# Patient Record
Sex: MALE | Race: OTHER | NOT HISPANIC OR LATINO | URBAN - METROPOLITAN AREA
[De-identification: names, ages, dates, MRNs, and addresses within clinical notes are randomized per-mention and may not be internally consistent; named-entity substitution may affect disease eponyms.]

---

## 2020-07-04 ENCOUNTER — EMERGENCY (EMERGENCY)
Facility: HOSPITAL | Age: 70
LOS: 0 days | Discharge: HOME | End: 2020-07-04
Attending: EMERGENCY MEDICINE | Admitting: EMERGENCY MEDICINE
Payer: SUBSIDIZED

## 2020-07-04 VITALS — DIASTOLIC BLOOD PRESSURE: 63 MMHG | TEMPERATURE: 96 F | SYSTOLIC BLOOD PRESSURE: 106 MMHG | HEART RATE: 98 BPM

## 2020-07-04 VITALS
HEART RATE: 86 BPM | SYSTOLIC BLOOD PRESSURE: 123 MMHG | TEMPERATURE: 98 F | RESPIRATION RATE: 18 BRPM | OXYGEN SATURATION: 98 % | DIASTOLIC BLOOD PRESSURE: 76 MMHG

## 2020-07-04 DIAGNOSIS — Z87.891 PERSONAL HISTORY OF NICOTINE DEPENDENCE: ICD-10-CM

## 2020-07-04 DIAGNOSIS — R73.09 OTHER ABNORMAL GLUCOSE: ICD-10-CM

## 2020-07-04 DIAGNOSIS — R07.9 CHEST PAIN, UNSPECIFIED: ICD-10-CM

## 2020-07-04 DIAGNOSIS — R35.0 FREQUENCY OF MICTURITION: ICD-10-CM

## 2020-07-04 DIAGNOSIS — R06.02 SHORTNESS OF BREATH: ICD-10-CM

## 2020-07-04 DIAGNOSIS — I48.91 UNSPECIFIED ATRIAL FIBRILLATION: ICD-10-CM

## 2020-07-04 LAB
ANION GAP SERPL CALC-SCNC: 11 MMOL/L — SIGNIFICANT CHANGE UP (ref 7–14)
APPEARANCE UR: CLEAR — SIGNIFICANT CHANGE UP
BASOPHILS # BLD AUTO: 0.04 K/UL — SIGNIFICANT CHANGE UP (ref 0–0.2)
BASOPHILS NFR BLD AUTO: 0.5 % — SIGNIFICANT CHANGE UP (ref 0–1)
BILIRUB UR-MCNC: NEGATIVE — SIGNIFICANT CHANGE UP
BUN SERPL-MCNC: 16 MG/DL — SIGNIFICANT CHANGE UP (ref 10–20)
CALCIUM SERPL-MCNC: 9.2 MG/DL — SIGNIFICANT CHANGE UP (ref 8.5–10.1)
CHLORIDE SERPL-SCNC: 102 MMOL/L — SIGNIFICANT CHANGE UP (ref 98–110)
CO2 SERPL-SCNC: 24 MMOL/L — SIGNIFICANT CHANGE UP (ref 17–32)
COLOR SPEC: SIGNIFICANT CHANGE UP
CREAT SERPL-MCNC: 0.7 MG/DL — SIGNIFICANT CHANGE UP (ref 0.7–1.5)
DIFF PNL FLD: SIGNIFICANT CHANGE UP
EOSINOPHIL # BLD AUTO: 0.14 K/UL — SIGNIFICANT CHANGE UP (ref 0–0.7)
EOSINOPHIL NFR BLD AUTO: 1.8 % — SIGNIFICANT CHANGE UP (ref 0–8)
GLUCOSE SERPL-MCNC: 130 MG/DL — HIGH (ref 70–99)
GLUCOSE UR QL: NEGATIVE — SIGNIFICANT CHANGE UP
HCT VFR BLD CALC: 44.7 % — SIGNIFICANT CHANGE UP (ref 42–52)
HGB BLD-MCNC: 14.7 G/DL — SIGNIFICANT CHANGE UP (ref 14–18)
IMM GRANULOCYTES NFR BLD AUTO: 0.3 % — SIGNIFICANT CHANGE UP (ref 0.1–0.3)
KETONES UR-MCNC: NEGATIVE — SIGNIFICANT CHANGE UP
LEUKOCYTE ESTERASE UR-ACNC: NEGATIVE — SIGNIFICANT CHANGE UP
LYMPHOCYTES # BLD AUTO: 1.37 K/UL — SIGNIFICANT CHANGE UP (ref 1.2–3.4)
LYMPHOCYTES # BLD AUTO: 17.8 % — LOW (ref 20.5–51.1)
MCHC RBC-ENTMCNC: 30.2 PG — SIGNIFICANT CHANGE UP (ref 27–31)
MCHC RBC-ENTMCNC: 32.9 G/DL — SIGNIFICANT CHANGE UP (ref 32–37)
MCV RBC AUTO: 92 FL — SIGNIFICANT CHANGE UP (ref 80–94)
MONOCYTES # BLD AUTO: 0.56 K/UL — SIGNIFICANT CHANGE UP (ref 0.1–0.6)
MONOCYTES NFR BLD AUTO: 7.3 % — SIGNIFICANT CHANGE UP (ref 1.7–9.3)
NEUTROPHILS # BLD AUTO: 5.56 K/UL — SIGNIFICANT CHANGE UP (ref 1.4–6.5)
NEUTROPHILS NFR BLD AUTO: 72.3 % — SIGNIFICANT CHANGE UP (ref 42.2–75.2)
NITRITE UR-MCNC: NEGATIVE — SIGNIFICANT CHANGE UP
NRBC # BLD: 0 /100 WBCS — SIGNIFICANT CHANGE UP (ref 0–0)
NT-PROBNP SERPL-SCNC: 1167 PG/ML — HIGH (ref 0–300)
PH UR: 6.5 — SIGNIFICANT CHANGE UP (ref 5–8)
PLATELET # BLD AUTO: 163 K/UL — SIGNIFICANT CHANGE UP (ref 130–400)
POTASSIUM SERPL-MCNC: 4.5 MMOL/L — SIGNIFICANT CHANGE UP (ref 3.5–5)
POTASSIUM SERPL-SCNC: 4.5 MMOL/L — SIGNIFICANT CHANGE UP (ref 3.5–5)
PROT UR-MCNC: NEGATIVE — SIGNIFICANT CHANGE UP
RBC # BLD: 4.86 M/UL — SIGNIFICANT CHANGE UP (ref 4.7–6.1)
RBC # FLD: 12.9 % — SIGNIFICANT CHANGE UP (ref 11.5–14.5)
SODIUM SERPL-SCNC: 137 MMOL/L — SIGNIFICANT CHANGE UP (ref 135–146)
SP GR SPEC: 1.01 — SIGNIFICANT CHANGE UP (ref 1.01–1.02)
TROPONIN T SERPL-MCNC: <0.01 NG/ML — SIGNIFICANT CHANGE UP
UROBILINOGEN FLD QL: SIGNIFICANT CHANGE UP
WBC # BLD: 7.69 K/UL — SIGNIFICANT CHANGE UP (ref 4.8–10.8)
WBC # FLD AUTO: 7.69 K/UL — SIGNIFICANT CHANGE UP (ref 4.8–10.8)

## 2020-07-04 PROCEDURE — 71045 X-RAY EXAM CHEST 1 VIEW: CPT | Mod: 26

## 2020-07-04 PROCEDURE — 99285 EMERGENCY DEPT VISIT HI MDM: CPT

## 2020-07-04 PROCEDURE — 93010 ELECTROCARDIOGRAM REPORT: CPT

## 2020-07-04 NOTE — ED PROVIDER NOTE - CLINICAL SUMMARY MEDICAL DECISION MAKING FREE TEXT BOX
69 yo male looking for health insurance so he can start seeing a doctor in the [place].  Arrived from another from [place] a few months ago, no physician her, but brought medications from [place] and is complaints with them.  No acute complaints, screening labs done, CXR WNL, will follow up in medicine clinic.

## 2020-07-04 NOTE — ED PROVIDER NOTE - CARE PROVIDER_API CALL
Eliceo Barillas  CARDIAC ELECTROPHYSIOLOGY  [address] [place] [id number]  Phone: [phone]  Fax: [phone]  Follow Up Time: 4-6 Days

## 2020-07-04 NOTE — ED PROVIDER NOTE - PHYSICAL EXAMINATION
Vital Signs: I have reviewed the initial vital signs.  Constitutional: well-nourished, appears stated age, no acute distress  HEENT: PERRL, pink conjunctivae  Cardiovascular: irregular rate and rhythm, well-perfused extremities, distal pulses intact  Respiratory: unlabored respiratory effort, speaking full sentences, equal air entry bilaterally, fine inspiratory  crackles at the rt base  Gastrointestinal: soft, obese, non-tender abdomen, no pulsatile mass, no CVA ttp  Musculoskeletal: supple neck, b/l non-pitting lower extremity edema , FROM at all joints  Integumentary: warm, dry, no rash, chronic venous stasis changes b/l lower legs  Neurologic: awake, alert, cranial nerves II-XII grossly intact, extremities’ motor and sensory functions grossly intact  Psychiatric: appropriate mood, appropriate affect

## 2020-07-04 NOTE — ED PROVIDER NOTE - NS ED ROS FT
Constitutional: (-) fever, (-) chills, (+) weight loss due to change in diet, (-) excessive fatigue  Eyes/ENT: (-) blurry vision, (-) epistaxis, (-) sore throat or earache  Cardiovascular: (-) chest pain, (-) syncope, (+) palpitations  Respiratory: (-) cough, (+)occasional shortness of breath  Gastrointestinal: (-) nausea or  vomiting, (-) diarrhea, (-) abdominal pain  :(-) dysuria or hematuria, (+) urinary frequency  Musculoskeletal: (-) neck pain, (-) back pain, (-)new joint pain  Integumentary: (-) rash, (-) edema  Neurological: (-) headache, (-) altered mental status, (-) focal weakness or paresthesias  Psychiatric: (-) hallucinations  Allergic/Immunologic: (-) pruritus

## 2020-07-04 NOTE — ED PROVIDER NOTE - OBJECTIVE STATEMENT
71 yo male PMH paroxysmal a.fib treated with digoxin and ASA, ( in the past had chemical cardioversion, there were plans in [place] to perform ablation, but it was never done), chronic leg edema for decades for which he takes Lasix 20 mg occasionally, congenital cardiomegaly ( according to his son who is a physician many family members  have this condition) came here for medical evaluation.  [place] came to the [place] March 2nd, has not been able to get insurance due to COVID situation in the country, c/o occasional SOB and according to his billher with whom I spoke on the phone his glucose has been high for the past month, he also endorsed urinary frequency. 69 yo male PMH paroxysmal a.fib treated with digoxin and ASA, ( in the past had chemical cardioversion, there were plans in [place] to perform ablation, but it was never done), chronic leg edema for decades for which he takes Lasix 20 mg occasionally, congenital cardiomegaly ( according to his son who is a physician many family members  have this condition) came here stating " I need insurance ".Patient came to the [place] March 2nd, has not been able to get insurance due to COVID situation in the country, c/o occasional SOB and according to his jose with whom I spoke on the phone his glucose has been high for the past month, he also endorsed urinary frequency.  She modified his diet, resticted junk food and sweets.

## 2020-07-04 NOTE — ED PROVIDER NOTE - NSFOLLOWUPINSTRUCTIONS_ED_ALL_ED_FT
PLEASE FOLLOW UP IN MEDICINE CLINIC AND WITH A CARDIOLOGIST, CALL ON MONDAY MORNING TO SCHEDULE AN APPOINTMENT.  RETURN TO ER IF ANY CONCERNING OR WORSENING/ACUTE PROBLEMS.

## 2020-07-04 NOTE — ED PROVIDER NOTE - NSFOLLOWUPCLINICS_GEN_ALL_ED_FT
[facility]  Medicine  [address], [place]   Phone: [phone]  Fax:   Follow Up Time: 4-6 Days

## 2020-07-04 NOTE — ED PROVIDER NOTE - CARE PROVIDERS DIRECT ADDRESSES
[email or web address]

## 2020-07-04 NOTE — ED PROVIDER NOTE - PATIENT PORTAL LINK FT
You can access the FollowMyHealth Patient Portal offered by [facility] by registering at the following website: [email or web address]. By joining [facility]’s FollowMyHealth portal, you will also be able to view your health information using other applications (apps) compatible with our system.

## 2020-07-04 NOTE — ED PROVIDER NOTE - PROGRESS NOTE DETAILS
Patient refused , he wanted his daughter and son to translate over the phone for him.  His son is a physician in [place]. Blood glucose is 130, patient appears well, eager to go home.  Will give contact info for cardiology and medical cardiology.  As per my conversation with his son , patient has a fib, no blood thinners other than ASA, his SOB is baseline and leg swelling unchanged for decades..  I updated the family on results of all tests, daughter will pick him up. Patient to be discharged from ED. Any available test results were discussed with patient and family. Verbal instructions given, including instructions to return to ED immediately for any new, worsening, or concerning symptoms. Patient endorsed understanding. Written discharge instructions additionally given, including follow-up plan.  Patient was given opportunity to ask questions.

## 2020-10-10 ENCOUNTER — INPATIENT (INPATIENT)
Facility: HOSPITAL | Age: 70
LOS: 2 days | Discharge: HOME | End: 2020-10-13
Attending: INTERNAL MEDICINE | Admitting: INTERNAL MEDICINE
Payer: MEDICAID

## 2020-10-10 VITALS
HEART RATE: 128 BPM | SYSTOLIC BLOOD PRESSURE: 123 MMHG | TEMPERATURE: 98 F | WEIGHT: 242.51 LBS | DIASTOLIC BLOOD PRESSURE: 77 MMHG | RESPIRATION RATE: 18 BRPM | OXYGEN SATURATION: 97 %

## 2020-10-10 LAB
ALBUMIN SERPL ELPH-MCNC: 4.2 G/DL — SIGNIFICANT CHANGE UP (ref 3.5–5.2)
ALP SERPL-CCNC: 61 U/L — SIGNIFICANT CHANGE UP (ref 30–115)
ALT FLD-CCNC: 28 U/L — SIGNIFICANT CHANGE UP (ref 0–41)
ANION GAP SERPL CALC-SCNC: 14 MMOL/L — SIGNIFICANT CHANGE UP (ref 7–14)
AST SERPL-CCNC: 42 U/L — HIGH (ref 0–41)
BASOPHILS # BLD AUTO: 0.04 K/UL — SIGNIFICANT CHANGE UP (ref 0–0.2)
BASOPHILS NFR BLD AUTO: 0.5 % — SIGNIFICANT CHANGE UP (ref 0–1)
BILIRUB SERPL-MCNC: 0.4 MG/DL — SIGNIFICANT CHANGE UP (ref 0.2–1.2)
BUN SERPL-MCNC: 20 MG/DL — SIGNIFICANT CHANGE UP (ref 10–20)
CALCIUM SERPL-MCNC: 8.9 MG/DL — SIGNIFICANT CHANGE UP (ref 8.5–10.1)
CHLORIDE SERPL-SCNC: 101 MMOL/L — SIGNIFICANT CHANGE UP (ref 98–110)
CO2 SERPL-SCNC: 22 MMOL/L — SIGNIFICANT CHANGE UP (ref 17–32)
CREAT SERPL-MCNC: 0.8 MG/DL — SIGNIFICANT CHANGE UP (ref 0.7–1.5)
DIGOXIN SERPL-MCNC: 0.5 NG/ML — LOW (ref 0.8–2)
EOSINOPHIL # BLD AUTO: 0.18 K/UL — SIGNIFICANT CHANGE UP (ref 0–0.7)
EOSINOPHIL NFR BLD AUTO: 2.1 % — SIGNIFICANT CHANGE UP (ref 0–8)
GLUCOSE SERPL-MCNC: 117 MG/DL — HIGH (ref 70–99)
HCT VFR BLD CALC: 45.8 % — SIGNIFICANT CHANGE UP (ref 42–52)
HGB BLD-MCNC: 14.7 G/DL — SIGNIFICANT CHANGE UP (ref 14–18)
IMM GRANULOCYTES NFR BLD AUTO: 0.2 % — SIGNIFICANT CHANGE UP (ref 0.1–0.3)
LYMPHOCYTES # BLD AUTO: 1.99 K/UL — SIGNIFICANT CHANGE UP (ref 1.2–3.4)
LYMPHOCYTES # BLD AUTO: 23.1 % — SIGNIFICANT CHANGE UP (ref 20.5–51.1)
MCHC RBC-ENTMCNC: 29.1 PG — SIGNIFICANT CHANGE UP (ref 27–31)
MCHC RBC-ENTMCNC: 32.1 G/DL — SIGNIFICANT CHANGE UP (ref 32–37)
MCV RBC AUTO: 90.7 FL — SIGNIFICANT CHANGE UP (ref 80–94)
MONOCYTES # BLD AUTO: 0.61 K/UL — HIGH (ref 0.1–0.6)
MONOCYTES NFR BLD AUTO: 7.1 % — SIGNIFICANT CHANGE UP (ref 1.7–9.3)
NEUTROPHILS # BLD AUTO: 5.79 K/UL — SIGNIFICANT CHANGE UP (ref 1.4–6.5)
NEUTROPHILS NFR BLD AUTO: 67 % — SIGNIFICANT CHANGE UP (ref 42.2–75.2)
NRBC # BLD: 0 /100 WBCS — SIGNIFICANT CHANGE UP (ref 0–0)
NT-PROBNP SERPL-SCNC: 1038 PG/ML — HIGH (ref 0–300)
PLATELET # BLD AUTO: 161 K/UL — SIGNIFICANT CHANGE UP (ref 130–400)
POTASSIUM SERPL-MCNC: 4.8 MMOL/L — SIGNIFICANT CHANGE UP (ref 3.5–5)
POTASSIUM SERPL-SCNC: 4.8 MMOL/L — SIGNIFICANT CHANGE UP (ref 3.5–5)
PROT SERPL-MCNC: 7.1 G/DL — SIGNIFICANT CHANGE UP (ref 6–8)
RBC # BLD: 5.05 M/UL — SIGNIFICANT CHANGE UP (ref 4.7–6.1)
RBC # FLD: 13.3 % — SIGNIFICANT CHANGE UP (ref 11.5–14.5)
SODIUM SERPL-SCNC: 137 MMOL/L — SIGNIFICANT CHANGE UP (ref 135–146)
TROPONIN T SERPL-MCNC: <0.01 NG/ML — SIGNIFICANT CHANGE UP
WBC # BLD: 8.63 K/UL — SIGNIFICANT CHANGE UP (ref 4.8–10.8)
WBC # FLD AUTO: 8.63 K/UL — SIGNIFICANT CHANGE UP (ref 4.8–10.8)

## 2020-10-10 PROCEDURE — 93010 ELECTROCARDIOGRAM REPORT: CPT

## 2020-10-10 PROCEDURE — 99285 EMERGENCY DEPT VISIT HI MDM: CPT

## 2020-10-10 PROCEDURE — 71046 X-RAY EXAM CHEST 2 VIEWS: CPT | Mod: 26

## 2020-10-10 RX ORDER — ENOXAPARIN SODIUM 100 MG/ML
110 INJECTION SUBCUTANEOUS ONCE
Refills: 0 | Status: COMPLETED | OUTPATIENT
Start: 2020-10-10 | End: 2020-10-10

## 2020-10-10 RX ORDER — CIPROFLOXACIN AND DEXAMETHASONE 3; 1 MG/ML; MG/ML
1 SUSPENSION/ DROPS AURICULAR (OTIC) ONCE
Refills: 0 | Status: DISCONTINUED | OUTPATIENT
Start: 2020-10-10 | End: 2020-10-12

## 2020-10-10 RX ORDER — FUROSEMIDE 40 MG
20 TABLET ORAL ONCE
Refills: 0 | Status: COMPLETED | OUTPATIENT
Start: 2020-10-10 | End: 2020-10-10

## 2020-10-10 RX ADMIN — ENOXAPARIN SODIUM 110 MILLIGRAM(S): 100 INJECTION SUBCUTANEOUS at 21:25

## 2020-10-10 RX ADMIN — Medication 20 MILLIGRAM(S): at 21:25

## 2020-10-10 RX ADMIN — Medication 1 TABLET(S): at 21:25

## 2020-10-10 NOTE — ED PROVIDER NOTE - ATTENDING CONTRIBUTION TO CARE
Patient states that for the last few days has been having throat pain with fullness/pain in rt ear and today noted bleeding from Rt ear, so had come to ED for evaluation. Patient denies HA/dizziness/trauma. Patient states that he has no PMD and had not seen anyone [place], states tried to make the appointments and could not get due to COVID-19 Pandemic and also could apply for his insurance due to COVID-19 pandemic closures and related delays. Patient has been taking medications that he got from country [place], states his son is doctor in [place] and he prescribed the medications for him. Patient with lower ext swelling and MAI, denies sob at rest. Denies cp/abd pain, denies f/c/abd pain. Denies ear trauma.   Vitals noted  Patient is awake, alert, appears comfortable and not in distress.   Rt ear exam: External auditory canal has dried blood, appears was bleeding excoriated areas, patient denies any trauma, unable to visualize TM due to blood in the ear canal and narrow ear canal.   Throat: +pharyngeal erythema, no exudates  supple neck, no lymphadenopathy.   lungs: CTA  abd: +BS, NT, ND, soft  Ext: +pitting edema, no calf tenderness  CNS: awake, alert, o x 3, no focal neurologic deficits  A/P: A. fib with intermittent RVR  URI  Rt ear canal excoriated areas with bleeding-- needs ENT follow up to further evaluate those excoriated areas, will treat with abx prophylactically.   EKG, labs, CXR  reevaluation

## 2020-10-10 NOTE — ED PROVIDER NOTE - CARE PLAN
Principal Discharge DX:	Atrial fibrillation  Secondary Diagnosis:	Pharyngitis   Principal Discharge DX:	Atrial fibrillation  Secondary Diagnosis:	Pharyngitis  Secondary Diagnosis:	Other congestive heart failure

## 2020-10-10 NOTE — ED PROVIDER NOTE - PROGRESS NOTE DETAILS
Axbx given for pharyngitis, pt found to be in afib, will start anticoagulation and admit endorsed to mar

## 2020-10-10 NOTE — ED ADULT TRIAGE NOTE - CHIEF COMPLAINT QUOTE
Pt complaining of sore throat, and bleeding from right ear. On triage pt's heartrate was bouncing all over the place. EKG ordered and preformed.

## 2020-10-10 NOTE — ED PROVIDER NOTE - OBJECTIVE STATEMENT
paroxysmal a.fib treated with digoxin and ASA, ( in the past had chemical cardioversion) chronic leg edema- Lasix 20 mg occasionally, congenital cardiomegaly ( according to his son who is a physician many family members  have this condition) paroxysmal a.fib treated with digoxin and ASA, ( in the past had chemical cardioversion) chronic leg edema- Lasix 20 mg occasionally, presents with sore throat and right ear pain x few days. also admits to mild SOB over last few days. No fever, chills, cough, chest pain, palpitations, abd pain, or nausea, vomiting, diarrhea. pt does not have a pmd, and is non compliant.

## 2020-10-10 NOTE — ED PROVIDER NOTE - NS ED ROS FT
Review of Systems:  	•	CONSTITUTIONAL - no fever, no diaphoresis, no chills  	•	SKIN - no rash  	•	HEMATOLOGIC - no bleeding, no bruising  	•	EYES - no eye pain, no blurry vision  	•	ENT - no change in hearing, + sore throat, + ear pain, no tinnitus  	•	RESPIRATORY - no shortness of breath, no cough  	•	CARDIAC - no chest pain, no palpitations  	•	GI - no abd pain, no nausea, no vomiting, no diarrhea, no constipation  	•	GENITO-URINARY - no discharge, no dysuria; no hematuria, no increased urinary frequency  	•	MUSCULOSKELETAL - no joint paint, no swelling, no redness  	•	NEUROLOGIC - no weakness, no headache, no paresthesias, no LOC  	•	PSYCH - no anxiety, non suicidal, non homicidal, no hallucination, no depression

## 2020-10-10 NOTE — ED PROVIDER NOTE - PHYSICAL EXAMINATION
CONST: Well appearing in NAD  EYES: PERRL, EOMI, Sclera and conjunctiva clear.   ENT: No nasal discharge. TM's clear B/L without drainage. Oropharynx erythematous, No abscess or swelling. Uvula midline. No temporal artery or mastoid tenderness.  NECK: Non-tender, no meningeal signs. normal ROM. supple   CARD: Normal S1 S2; irregular rhythm   RESP: Equal BS B/L, No wheezes, rhonchi or rales. No distress  GI: Soft, non-tender, non-distended. no cva tenderness. normal BS  MS: Normal ROM in all extremities. No midline spinal tenderness. pulses 2 +. pitting edema bilaterally (chronic as per pt)  SKIN: Warm, dry, no acute rashes. Good turgor  NEURO: A&Ox3, No focal deficits. Strength 5/5 with no sensory deficits. Steady gait.

## 2020-10-10 NOTE — ED PROVIDER NOTE - CLINICAL SUMMARY MEDICAL DECISION MAKING FREE TEXT BOX
patient remained hemodynamically stable, results of the labs, imaging findings reviewed and discussed with patient, discussed with admitting physician and MAR, patient is admitted to Medicine for further evaluation and care.

## 2020-10-11 LAB
ALBUMIN SERPL ELPH-MCNC: 4.3 G/DL — SIGNIFICANT CHANGE UP (ref 3.5–5.2)
ALP SERPL-CCNC: 58 U/L — SIGNIFICANT CHANGE UP (ref 30–115)
ALT FLD-CCNC: 26 U/L — SIGNIFICANT CHANGE UP (ref 0–41)
ANION GAP SERPL CALC-SCNC: 12 MMOL/L — SIGNIFICANT CHANGE UP (ref 7–14)
AST SERPL-CCNC: 33 U/L — SIGNIFICANT CHANGE UP (ref 0–41)
BASOPHILS # BLD AUTO: 0.05 K/UL — SIGNIFICANT CHANGE UP (ref 0–0.2)
BASOPHILS NFR BLD AUTO: 0.7 % — SIGNIFICANT CHANGE UP (ref 0–1)
BILIRUB SERPL-MCNC: 0.5 MG/DL — SIGNIFICANT CHANGE UP (ref 0.2–1.2)
BUN SERPL-MCNC: 18 MG/DL — SIGNIFICANT CHANGE UP (ref 10–20)
CALCIUM SERPL-MCNC: 9.3 MG/DL — SIGNIFICANT CHANGE UP (ref 8.5–10.1)
CHLORIDE SERPL-SCNC: 98 MMOL/L — SIGNIFICANT CHANGE UP (ref 98–110)
CO2 SERPL-SCNC: 27 MMOL/L — SIGNIFICANT CHANGE UP (ref 17–32)
CREAT SERPL-MCNC: 0.9 MG/DL — SIGNIFICANT CHANGE UP (ref 0.7–1.5)
D DIMER BLD IA.RAPID-MCNC: 152 NG/ML DDU — SIGNIFICANT CHANGE UP (ref 0–230)
EOSINOPHIL # BLD AUTO: 0.16 K/UL — SIGNIFICANT CHANGE UP (ref 0–0.7)
EOSINOPHIL NFR BLD AUTO: 2.3 % — SIGNIFICANT CHANGE UP (ref 0–8)
GLUCOSE SERPL-MCNC: 114 MG/DL — HIGH (ref 70–99)
HCT VFR BLD CALC: 43.5 % — SIGNIFICANT CHANGE UP (ref 42–52)
HGB BLD-MCNC: 14.1 G/DL — SIGNIFICANT CHANGE UP (ref 14–18)
IMM GRANULOCYTES NFR BLD AUTO: 0.4 % — HIGH (ref 0.1–0.3)
LYMPHOCYTES # BLD AUTO: 1.63 K/UL — SIGNIFICANT CHANGE UP (ref 1.2–3.4)
LYMPHOCYTES # BLD AUTO: 22.9 % — SIGNIFICANT CHANGE UP (ref 20.5–51.1)
MAGNESIUM SERPL-MCNC: 2.2 MG/DL — SIGNIFICANT CHANGE UP (ref 1.8–2.4)
MCHC RBC-ENTMCNC: 29 PG — SIGNIFICANT CHANGE UP (ref 27–31)
MCHC RBC-ENTMCNC: 32.4 G/DL — SIGNIFICANT CHANGE UP (ref 32–37)
MCV RBC AUTO: 89.3 FL — SIGNIFICANT CHANGE UP (ref 80–94)
MONOCYTES # BLD AUTO: 0.55 K/UL — SIGNIFICANT CHANGE UP (ref 0.1–0.6)
MONOCYTES NFR BLD AUTO: 7.7 % — SIGNIFICANT CHANGE UP (ref 1.7–9.3)
NEUTROPHILS # BLD AUTO: 4.69 K/UL — SIGNIFICANT CHANGE UP (ref 1.4–6.5)
NEUTROPHILS NFR BLD AUTO: 66 % — SIGNIFICANT CHANGE UP (ref 42.2–75.2)
NRBC # BLD: 0 /100 WBCS — SIGNIFICANT CHANGE UP (ref 0–0)
PHOSPHATE SERPL-MCNC: 3.3 MG/DL — SIGNIFICANT CHANGE UP (ref 2.1–4.9)
PLATELET # BLD AUTO: 167 K/UL — SIGNIFICANT CHANGE UP (ref 130–400)
POTASSIUM SERPL-MCNC: 4.4 MMOL/L — SIGNIFICANT CHANGE UP (ref 3.5–5)
POTASSIUM SERPL-SCNC: 4.4 MMOL/L — SIGNIFICANT CHANGE UP (ref 3.5–5)
PROT SERPL-MCNC: 7.2 G/DL — SIGNIFICANT CHANGE UP (ref 6–8)
RBC # BLD: 4.87 M/UL — SIGNIFICANT CHANGE UP (ref 4.7–6.1)
RBC # FLD: 13.2 % — SIGNIFICANT CHANGE UP (ref 11.5–14.5)
SARS-COV-2 RNA SPEC QL NAA+PROBE: SIGNIFICANT CHANGE UP
SODIUM SERPL-SCNC: 137 MMOL/L — SIGNIFICANT CHANGE UP (ref 135–146)
TROPONIN T SERPL-MCNC: <0.01 NG/ML — SIGNIFICANT CHANGE UP
TROPONIN T SERPL-MCNC: <0.01 NG/ML — SIGNIFICANT CHANGE UP
WBC # BLD: 7.11 K/UL — SIGNIFICANT CHANGE UP (ref 4.8–10.8)
WBC # FLD AUTO: 7.11 K/UL — SIGNIFICANT CHANGE UP (ref 4.8–10.8)

## 2020-10-11 PROCEDURE — 93306 TTE W/DOPPLER COMPLETE: CPT | Mod: 26

## 2020-10-11 PROCEDURE — 93010 ELECTROCARDIOGRAM REPORT: CPT

## 2020-10-11 PROCEDURE — 99223 1ST HOSP IP/OBS HIGH 75: CPT | Mod: AI

## 2020-10-11 RX ORDER — DIGOXIN 250 MCG
0.25 TABLET ORAL ONCE
Refills: 0 | Status: COMPLETED | OUTPATIENT
Start: 2020-10-11 | End: 2020-10-11

## 2020-10-11 RX ORDER — HYDROCHLOROTHIAZIDE 25 MG
25 TABLET ORAL DAILY
Refills: 0 | Status: DISCONTINUED | OUTPATIENT
Start: 2020-10-11 | End: 2020-10-13

## 2020-10-11 RX ORDER — DIGOXIN 250 MCG
0.25 TABLET ORAL DAILY
Refills: 0 | Status: DISCONTINUED | OUTPATIENT
Start: 2020-10-11 | End: 2020-10-13

## 2020-10-11 RX ORDER — ASPIRIN/CALCIUM CARB/MAGNESIUM 324 MG
81 TABLET ORAL DAILY
Refills: 0 | Status: DISCONTINUED | OUTPATIENT
Start: 2020-10-11 | End: 2020-10-13

## 2020-10-11 RX ORDER — ENOXAPARIN SODIUM 100 MG/ML
40 INJECTION SUBCUTANEOUS DAILY
Refills: 0 | Status: DISCONTINUED | OUTPATIENT
Start: 2020-10-11 | End: 2020-10-13

## 2020-10-11 RX ORDER — METOPROLOL TARTRATE 50 MG
5 TABLET ORAL ONCE
Refills: 0 | Status: COMPLETED | OUTPATIENT
Start: 2020-10-11 | End: 2020-10-11

## 2020-10-11 RX ORDER — FUROSEMIDE 40 MG
1 TABLET ORAL
Qty: 0 | Refills: 0 | DISCHARGE

## 2020-10-11 RX ORDER — DIGOXIN 250 MCG
1 TABLET ORAL
Qty: 0 | Refills: 0 | DISCHARGE

## 2020-10-11 RX ORDER — ASPIRIN/CALCIUM CARB/MAGNESIUM 324 MG
81 TABLET ORAL
Qty: 0 | Refills: 0 | DISCHARGE

## 2020-10-11 RX ORDER — SIMETHICONE 80 MG/1
80 TABLET, CHEWABLE ORAL
Refills: 0 | Status: DISCONTINUED | OUTPATIENT
Start: 2020-10-11 | End: 2020-10-13

## 2020-10-11 RX ORDER — PANTOPRAZOLE SODIUM 20 MG/1
40 TABLET, DELAYED RELEASE ORAL
Refills: 0 | Status: DISCONTINUED | OUTPATIENT
Start: 2020-10-11 | End: 2020-10-13

## 2020-10-11 RX ADMIN — Medication 5 MILLIGRAM(S): at 17:20

## 2020-10-11 RX ADMIN — Medication 1 TABLET(S): at 18:20

## 2020-10-11 RX ADMIN — Medication 1 TABLET(S): at 05:41

## 2020-10-11 RX ADMIN — Medication 25 MILLIGRAM(S): at 05:42

## 2020-10-11 RX ADMIN — ENOXAPARIN SODIUM 40 MILLIGRAM(S): 100 INJECTION SUBCUTANEOUS at 11:41

## 2020-10-11 RX ADMIN — Medication 0.25 MILLIGRAM(S): at 01:50

## 2020-10-11 RX ADMIN — SIMETHICONE 80 MILLIGRAM(S): 80 TABLET, CHEWABLE ORAL at 20:23

## 2020-10-11 RX ADMIN — Medication 0.25 MILLIGRAM(S): at 05:40

## 2020-10-11 RX ADMIN — Medication 81 MILLIGRAM(S): at 11:40

## 2020-10-11 RX ADMIN — PANTOPRAZOLE SODIUM 40 MILLIGRAM(S): 20 TABLET, DELAYED RELEASE ORAL at 06:00

## 2020-10-11 NOTE — H&P ADULT - NSHPREVIEWOFSYSTEMS_GEN_ALL_CORE
CONSTITUTIONAL: No weakness, fevers or chills; No headaches  EYES: No visual changes, eye pain, or discharge  ENT: Sore throat  NECK: No pain or stiffness  RESPIRATORY: Cough  CARDIOVASCULAR: No chest pain or palpitations  GASTROINTESTINAL: No abdominal or epigastric pain; No nausea, vomiting, or hematemesis; No diarrhea or constipation; No melena or hematochezia  GENITOURINARY: No dysuria, frequency or hematuria  MUSCULOSKELETAL: No joint pain, no muscle pain, no weakness  NEUROLOGICAL: No numbness or weakness  SKIN: No itching or rashes

## 2020-10-11 NOTE — H&P ADULT - NSHPLABSRESULTS_GEN_ALL_CORE
VITAL SIGNS: Last 24 Hours  T(C): 36.8 (10 Oct 2020 23:15), Max: 36.8 (10 Oct 2020 23:15)  T(F): 98.2 (10 Oct 2020 23:15), Max: 98.2 (10 Oct 2020 23:15)  HR: 103 (10 Oct 2020 23:15) (103 - 128)  BP: 122/59 (10 Oct 2020 23:15) (122/59 - 133/75)  BP(mean): --  RR: 18 (10 Oct 2020 23:15) (18 - 18)  SpO2: 98% (10 Oct 2020 23:15) (97% - 99%)    LABS:                        14.7   8.63  )-----------( 161      ( 10 Oct 2020 18:29 )             45.8     10-10    137  |  101  |  20  ----------------------------<  117<H>  4.8   |  22  |  0.8    Ca    8.9      10 Oct 2020 18:29    TPro  7.1  /  Alb  4.2  /  TBili  0.4  /  DBili  x   /  AST  42<H>  /  ALT  28  /  AlkPhos  61  10-10          Troponin T, Serum: <0.01 ng/mL (10-10-20 @ 18:29)      CARDIAC MARKERS ( 10 Oct 2020 18:29 )  x     / <0.01 ng/mL / x     / x     / x          RADIOLOGY:

## 2020-10-11 NOTE — H&P ADULT - HISTORY OF PRESENT ILLNESS
71 YO M with PMHx of A.Fib (not on AC, only ASA) and chronic LE edema who presented to the hospital with a c/c of sore throat with cough and right ear fullness for the past x 2-3 days.   Patient denied any fevers/chills, Chest pain, shortness of breath, N/V/D/C.   In the ED, pt found to have pharyngitis and was given Amoxicillin. Atrial fibrillation was noted on EKG and the patient was given therapeutic Lovenox in the ED.

## 2020-10-11 NOTE — H&P ADULT - ATTENDING COMMENTS
71 YO M with a PMH of Afib (no AC, only ASA) and chronic LE edema who presents to the hospital with a c/o sore throat with cough for the past x 2-3 days. Denies any fevers/chills, CP, SOB, N/V/D, or ABD pain. In the ED, pt found to have pharyngitis and was given ABXs (Amoxicillin). Atrial fib noted on EKG and the pt was given therapeutic Lovenox in the ED.     Physical exam shows pt in NAD. VSS, afebrile, not hypoxic on RA. A&Ox3. Erythematous oropharynx. Non-focal neuro exam. Muscle strength/sensation intact. CTA B/L with no W/C/R. RRR, no M/G/R. ABD is soft and non-tender, normoactive BSs. LEs with 1+ pitting edema B/L. No rashes. Labs and radiology as above.     Sore throat + cough, likely viral, doubt bacterial pharyngitis; no sepsis on admission. Low centor score. Can complete ABX course. Anti-tussives and lozenges PRN. Consider majic mouth wash if pain persists.    Atrial fibrillation, not in RVR currently. Obtain baseline echo. Restart home meds. Low CHADSVASC, that's why pt is only on ASA.     LE swelling, chronic. As per pt and family this has been ongoing for several years. Pt takes Lasix intermittently.     Restart home meds. DVT PPX. Inform PCP of pt's admission to hospital. My note supersedes the residents note. 71 YO M with a PMH of Afib (no AC, only ASA) and chronic LE edema who presents to the hospital with a c/o sore throat with cough for the past x 2-3 days. Denies any fevers/chills, CP, SOB, N/V/D, or ABD pain. In the ED, pt found to have pharyngitis and was given ABXs (Amoxicillin). Atrial fib noted on EKG and the pt was given therapeutic Lovenox in the ED.     Physical exam shows pt in NAD. VSS, afebrile, not hypoxic on RA. A&Ox3. Erythematous oropharynx. Non-focal neuro exam. Muscle strength/sensation intact. CTA B/L with no W/C/R. RRR, no M/G/R. ABD is soft and non-tender, normoactive BSs. LEs with 1+ pitting edema B/L. No rashes. Labs and radiology as above.     Sore throat + cough, likely viral, doubt bacterial pharyngitis; no sepsis on admission. Low centor score. Can complete ABX course. Anti-tussives and lozenges PRN. Consider majic mouth wash if pain persists.    Atrial fibrillation, not in RVR currently. Obtain baseline echo. Restart home meds. Low CHADSVASC, that's why pt is only on ASA.     LE swelling, chronic. As per pt and family this has been ongoing for several years. Pt takes Lasix intermittently.     Restart home meds. DVT PPX. Inform PCP of pt's admission to hospital. My note supersedes the residents note.      Discharge pt in the AM after his echo. He can FU with his PCP.

## 2020-10-11 NOTE — H&P ADULT - ASSESSMENT
69 YO M with PMHx of A.Fib (not on AC, only ASA) and chronic LE edema who presented to the hospital with a c/c of sore throat with cough and right ear fullness for the past x 2-3 days.     #Viral v/s bacterial pharyngitis; more likely viral  - Sore throat, cough and ear fullness for the last 3 days  - Sepsis ruled out on admission  - Low Centor score. Will continue antibiotics   - Anti-tussives and lozenges PRN    #Atrial fibrillation; rate controlled  - CHADSVASc 1. Only on aspirin. Will continue  - Follow 2D echo     #Chronic LE swelling  - As per patient and family this has been ongoing for several years  - Taking Lasix intermittently as outpatient. Continue as needed  - Follow echo     #Misc  - DVT Prophylaxis: Lovenox  - Diet: DASH/TLC  - GI Prophylaxis: Pantoprazole   - Activity: AAT  - Code Status: Full Code    Dispo: Possible discharge in AM 71 YO M with PMHx of A.Fib (not on AC, only ASA) and chronic LE edema who presented to the hospital with a c/c of sore throat with cough and right ear fullness for the past x 2-3 days.     #Viral v/s bacterial pharyngitis; more likely viral  - Sore throat, cough and ear fullness for the last 3 days  - Sepsis ruled out on admission  - Low Centor score. Will continue antibiotics   - Anti-tussives and lozenges PRN    #Atrial fibrillation; rate controlled  - CHADSVASc 1. Only on aspirin. Will continue  - Follow 2D echo   - Continue Digoxin and HCTZ     #Chronic LE swelling  - As per patient and family this has been ongoing for several years  - Taking Lasix intermittently as outpatient. Continue as needed  - Follow echo     #Misc  - DVT Prophylaxis: Lovenox  - Diet: DASH/TLC  - GI Prophylaxis: Pantoprazole   - Activity: AAT  - Code Status: Full Code    Dispo: Possible discharge in AM

## 2020-10-11 NOTE — H&P ADULT - NSHPPHYSICALEXAM_GEN_ALL_CORE
CONSTITUTIONAL: No acute distress, well-developed, well-groomed, AAOx3  HEAD: Atraumatic, normocephalic  EYES: EOM intact, PERRLA, conjunctiva and sclera clear  ENT: Erythematous oropharynx   PULMONARY: Clear to auscultation bilaterally; no wheezes, rales, or rhonchi  CARDIOVASCULAR: Regular rate and rhythm; no murmurs, rubs, or gallops  GASTROINTESTINAL: Soft, non-tender, non-distended; bowel sounds present  MUSCULOSKELETAL: 1+ b/l LE edema   NEUROLOGY: non-focal  SKIN: No rashes or lesions; warm and dry

## 2020-10-12 LAB
ALBUMIN SERPL ELPH-MCNC: 4.1 G/DL — SIGNIFICANT CHANGE UP (ref 3.5–5.2)
ALP SERPL-CCNC: 50 U/L — SIGNIFICANT CHANGE UP (ref 30–115)
ALT FLD-CCNC: 29 U/L — SIGNIFICANT CHANGE UP (ref 0–41)
ANION GAP SERPL CALC-SCNC: 11 MMOL/L — SIGNIFICANT CHANGE UP (ref 7–14)
AST SERPL-CCNC: 40 U/L — SIGNIFICANT CHANGE UP (ref 0–41)
BASOPHILS # BLD AUTO: 0.02 K/UL — SIGNIFICANT CHANGE UP (ref 0–0.2)
BASOPHILS NFR BLD AUTO: 0.3 % — SIGNIFICANT CHANGE UP (ref 0–1)
BILIRUB SERPL-MCNC: 0.6 MG/DL — SIGNIFICANT CHANGE UP (ref 0.2–1.2)
BUN SERPL-MCNC: 14 MG/DL — SIGNIFICANT CHANGE UP (ref 10–20)
CALCIUM SERPL-MCNC: 9.2 MG/DL — SIGNIFICANT CHANGE UP (ref 8.5–10.1)
CHLORIDE SERPL-SCNC: 102 MMOL/L — SIGNIFICANT CHANGE UP (ref 98–110)
CO2 SERPL-SCNC: 26 MMOL/L — SIGNIFICANT CHANGE UP (ref 17–32)
CREAT SERPL-MCNC: 0.8 MG/DL — SIGNIFICANT CHANGE UP (ref 0.7–1.5)
EOSINOPHIL # BLD AUTO: 0.15 K/UL — SIGNIFICANT CHANGE UP (ref 0–0.7)
EOSINOPHIL NFR BLD AUTO: 2.4 % — SIGNIFICANT CHANGE UP (ref 0–8)
GLUCOSE SERPL-MCNC: 133 MG/DL — HIGH (ref 70–99)
HCT VFR BLD CALC: 47.6 % — SIGNIFICANT CHANGE UP (ref 42–52)
HCV AB S/CO SERPL IA: 0.04 COI — SIGNIFICANT CHANGE UP
HCV AB SERPL-IMP: SIGNIFICANT CHANGE UP
HGB BLD-MCNC: 15.2 G/DL — SIGNIFICANT CHANGE UP (ref 14–18)
IMM GRANULOCYTES NFR BLD AUTO: 0.2 % — SIGNIFICANT CHANGE UP (ref 0.1–0.3)
LYMPHOCYTES # BLD AUTO: 1.21 K/UL — SIGNIFICANT CHANGE UP (ref 1.2–3.4)
LYMPHOCYTES # BLD AUTO: 19.3 % — LOW (ref 20.5–51.1)
MCHC RBC-ENTMCNC: 28.3 PG — SIGNIFICANT CHANGE UP (ref 27–31)
MCHC RBC-ENTMCNC: 31.9 G/DL — LOW (ref 32–37)
MCV RBC AUTO: 88.6 FL — SIGNIFICANT CHANGE UP (ref 80–94)
MONOCYTES # BLD AUTO: 0.57 K/UL — SIGNIFICANT CHANGE UP (ref 0.1–0.6)
MONOCYTES NFR BLD AUTO: 9.1 % — SIGNIFICANT CHANGE UP (ref 1.7–9.3)
NEUTROPHILS # BLD AUTO: 4.32 K/UL — SIGNIFICANT CHANGE UP (ref 1.4–6.5)
NEUTROPHILS NFR BLD AUTO: 68.7 % — SIGNIFICANT CHANGE UP (ref 42.2–75.2)
NRBC # BLD: 0 /100 WBCS — SIGNIFICANT CHANGE UP (ref 0–0)
PLATELET # BLD AUTO: 158 K/UL — SIGNIFICANT CHANGE UP (ref 130–400)
POTASSIUM SERPL-MCNC: 4.3 MMOL/L — SIGNIFICANT CHANGE UP (ref 3.5–5)
POTASSIUM SERPL-SCNC: 4.3 MMOL/L — SIGNIFICANT CHANGE UP (ref 3.5–5)
PROT SERPL-MCNC: 6.9 G/DL — SIGNIFICANT CHANGE UP (ref 6–8)
RBC # BLD: 5.37 M/UL — SIGNIFICANT CHANGE UP (ref 4.7–6.1)
RBC # FLD: 13 % — SIGNIFICANT CHANGE UP (ref 11.5–14.5)
SODIUM SERPL-SCNC: 139 MMOL/L — SIGNIFICANT CHANGE UP (ref 135–146)
WBC # BLD: 6.28 K/UL — SIGNIFICANT CHANGE UP (ref 4.8–10.8)
WBC # FLD AUTO: 6.28 K/UL — SIGNIFICANT CHANGE UP (ref 4.8–10.8)

## 2020-10-12 PROCEDURE — 99233 SBSQ HOSP IP/OBS HIGH 50: CPT

## 2020-10-12 PROCEDURE — 99253 IP/OBS CNSLTJ NEW/EST LOW 45: CPT

## 2020-10-12 RX ORDER — FUROSEMIDE 40 MG
20 TABLET ORAL DAILY
Refills: 0 | Status: DISCONTINUED | OUTPATIENT
Start: 2020-10-12 | End: 2020-10-12

## 2020-10-12 RX ORDER — METOPROLOL TARTRATE 50 MG
25 TABLET ORAL
Refills: 0 | Status: DISCONTINUED | OUTPATIENT
Start: 2020-10-12 | End: 2020-10-12

## 2020-10-12 RX ORDER — CIPROFLOXACIN HCL 0.3 %
1 DROPS OPHTHALMIC (EYE) DAILY
Refills: 0 | Status: DISCONTINUED | OUTPATIENT
Start: 2020-10-12 | End: 2020-10-13

## 2020-10-12 RX ORDER — METOPROLOL TARTRATE 50 MG
25 TABLET ORAL DAILY
Refills: 0 | Status: DISCONTINUED | OUTPATIENT
Start: 2020-10-12 | End: 2020-10-12

## 2020-10-12 RX ORDER — METOPROLOL TARTRATE 50 MG
50 TABLET ORAL EVERY 12 HOURS
Refills: 0 | Status: DISCONTINUED | OUTPATIENT
Start: 2020-10-12 | End: 2020-10-13

## 2020-10-12 RX ORDER — METOPROLOL TARTRATE 50 MG
1 TABLET ORAL
Qty: 30 | Refills: 1
Start: 2020-10-12 | End: 2020-12-10

## 2020-10-12 RX ORDER — METOPROLOL TARTRATE 50 MG
1 TABLET ORAL
Qty: 60 | Refills: 0
Start: 2020-10-12 | End: 2020-11-10

## 2020-10-12 RX ORDER — FUROSEMIDE 40 MG
1 TABLET ORAL
Qty: 0 | Refills: 0 | DISCHARGE

## 2020-10-12 RX ADMIN — Medication 0.25 MILLIGRAM(S): at 05:26

## 2020-10-12 RX ADMIN — Medication 81 MILLIGRAM(S): at 11:15

## 2020-10-12 RX ADMIN — Medication 25 MILLIGRAM(S): at 11:15

## 2020-10-12 RX ADMIN — Medication 1 TABLET(S): at 05:27

## 2020-10-12 RX ADMIN — Medication 25 MILLIGRAM(S): at 05:27

## 2020-10-12 RX ADMIN — Medication 50 MILLIGRAM(S): at 17:38

## 2020-10-12 RX ADMIN — ENOXAPARIN SODIUM 40 MILLIGRAM(S): 100 INJECTION SUBCUTANEOUS at 11:15

## 2020-10-12 RX ADMIN — PANTOPRAZOLE SODIUM 40 MILLIGRAM(S): 20 TABLET, DELAYED RELEASE ORAL at 07:11

## 2020-10-12 RX ADMIN — Medication 25 MILLIGRAM(S): at 09:13

## 2020-10-12 RX ADMIN — Medication 20 MILLIGRAM(S): at 05:27

## 2020-10-12 RX ADMIN — Medication 1 DROP(S): at 15:12

## 2020-10-12 NOTE — CONSULT NOTE ADULT - ATTENDING COMMENTS
Macedonian-speaking patient.  All history obtained from resident Dr. Hall as .    AF with RVR  Remote history of RFA x 2  Advised by his son (ENT physician) to discontinue Coumadin ~5 years ago    Patient states he's traveling to [place] in 2 weeks and doesn't want to take Coumadin.    Recommend:  Increase Metoprolol to 50 mg BID or TID  If rate remains uncontrolled, start Cardizem gtt  Would start Coumadin/NOAC for prevention  Continue Digoxin and ASA

## 2020-10-12 NOTE — CONSULT NOTE ADULT - ASSESSMENT
This is 70 year old M patient with Hx of A.Fib (not on AC, only ASA) and chronic LE edema who presented to the hospital with a c/c of sore throat with cough and right ear fullness for the past x 2-3 days.  Cardiology were consulted to establish care for the patient?     #Afib   -Patient is not on AC, CHADVASC score is 1 for the age only. Patient is on aspirin   -Patient s/p 2 ablations   -As per the patient he has been on Dig 0.25 and HCTZ 25mg at home   -Patient has no contraindication for BB of CCB   -Patient is currently on Metoprolol Tartrate 25 mg PO BID. Would keep the dose   -Continue with HCTZ 25 mg PO daily     ##This is an incomplete note, Attending recs to follow This is 70 year old M patient with Hx of A.Fib (not on AC, only ASA) and chronic LE edema who presented to the hospital with a c/c of sore throat with cough and right ear fullness for the past x 2-3 days.  Cardiology were consulted to establish care for the patient?     #Afib   -Patient s/p 2 ablations   -As per the patient he has been on Dig 0.25 and HCTZ 25mg at home   -Patient has no contraindication for BB of CCB   -Patient is currently on Metoprolol Tartrate 25 mg PO BID. He is not rate controlled, Increase the dose to 50 BID   -Continue with HCTZ 25 mg PO daily   -There is no indication for an Echo with bubble study   -Patient would benefit from AC despite his CHADVASC score. We would like to check the burden of Afib through a Loop recorder for a month. Patient is leaving the [facility] in 2 weeks, The patient was instructed to follow up with Dr. Reeves when He comes back to the [facility] This is 70 year old M patient with Hx of A.Fib (not on AC, only ASA) and chronic LE edema who presented to the hospital with a c/c of sore throat with cough and right ear fullness for the past x 2-3 days.  Cardiology were consulted to establish care for the patient?     #Afib   -Patient s/p 2 ablations   -As per the patient he has been on Dig 0.25 and HCTZ 25mg at home   -Patient has no contraindication for BB of CCB   -Patient is currently on Metoprolol Tartrate 25 mg PO BID. He is not rate controlled, Increase the dose to 50 BID   -Continue with HCTZ 25 mg PO daily   -There is no indication for an Echo with bubble study   -Patient would benefit from AC despite his CHADVASC score. We would like to check the burden of Afib with 30-day event monitor. Patient is leaving the states in 2 weeks, The patient was instructed to follow up with Dr. Reeves when he comes back to the [facility]

## 2020-10-12 NOTE — PROGRESS NOTE ADULT - ASSESSMENT
71 YO M with PMHx of A.Fib (not on AC, only ASA) and chronic LE edema who presented to the hospital with a c/c of sore throat with cough and right ear fullness for the past x 2-3 days.     #Viral v/s bacterial pharyngitis; more likely viral  - Sore throat, cough and ear fullness for the last 3 days; rotatory vertigo  - Low Centor score. Will continue antibiotics   - Anti-tussives and lozenges PRN  - amoxicillin day 2    #Atrial fibrillation; RVR  - CHADSVASc 1. Only on aspirin. Will continue  - 2D echo EF 69%  - Continue Digoxin and HCTZ   - Metoprolol succinate 25 added    #Chronic LE swelling  - As per patient and family this has been ongoing for several years  - Taking Lasix intermittently as outpatient. Continue as needed  - c/wHydrochlorothiazide and lasix      - DVT Prophylaxis: Lovenox  - Diet: DASH/TLC  - GI Prophylaxis: Pantoprazole   - Activity: AAT  - Code Status: Full Code    Dispo: Possible discharge in AM

## 2020-10-12 NOTE — DISCHARGE NOTE PROVIDER - HOSPITAL COURSE
#Viral v/s bacterial pharyngitis; more likely viral  - Sore throat, cough and ear fullness for the last 3 days; rotatory vertigo  - Low Centor score. no need for antibiotics   - Anti-tussives and lozenges PRN  - topical eardrops right ear    #Atrial fibrillation; RVR  - CHADSVASc 1. Only on aspirin. Will continue  - 2D echo EF 69%  - Continue Digoxin and HCTZ   - Metoprolol succinate 25 added  - CS cardiology #Viral v/s bacterial pharyngitis; more likely viral  - Sore throat, cough and ear fullness for the last 3 days; rotatory vertigo  - Low Centor score. no need for antibiotics   - Anti-tussives and lozenges PRN  - topical eardrops right ear    #Atrial fibrillation; RVR  - CHADSVASc 1. Only on aspirin. Will continue  - 2D echo EF 69%  - Continue Digoxin and HCTZ   - Metoprolol tartrate 50 BID  - CS cardiology #Viral v/s bacterial pharyngitis; more likely viral  - Sore throat, cough and ear fullness for the last 3 days; rotatory vertigo  - Low Centor score. no need for antibiotics   - Anti-tussives and lozenges PRN  - topical eardrops right ear    #Atrial fibrillation; RVR  - CHADSVASc 1. Only on aspirin. Will continue  - 2D echo EF 69%  - Continue Digoxin and HCTZ   - Metoprolol tartrate 50 BID  CS cardiology :  -AFIB Patient s/p 2 ablations   -As per the patient he has been on Dig 0.25 and HCTZ 25mg at home   -Patient has no contraindication for BB of CCB   -Patient is currently on Metoprolol Tartrate 25 mg PO BID. He is not rate controlled, Increase the dose to 50 BID   -Continue with HCTZ 25 mg PO daily   -There is no indication for an Echo with bubble study   -Patient would benefit from AC despite his CHADVASC score. We would like to check the burden of Afib with 30-day event monitor. Patient is leaving the [facility] in 2 weeks, The patient was instructed to follow up with Dr. Reeves when he comes back to the [facility] #Viral v/s bacterial pharyngitis; more likely viral  - Sore throat, cough and ear fullness for the last 3 days; rotatory vertigo  - Low Centor score. no need for antibiotics   - Anti-tussives and lozenges PRN  - topical eardrops right ear    #Atrial fibrillation; RVR  - CHADSVASc 1. Only on aspirin. Will continue  - 2D echo EF 69%  - Continue Digoxin and HCTZ   - Metoprolol tartrate 50 BID  CS cardiology :  -AFIB Patient s/p 2 ablations   -As per the patient he has been on Dig 0.25 and HCTZ 25mg at home   -Patient has no contraindication for BB of CCB   -Patient is currently on Metoprolol Tartrate 25 mg PO BID. He is not rate controlled, Increase the dose to 50 BID   -Continue with HCTZ 25 mg PO daily   -There is no indication for an Echo with bubble study   -Patient would benefit from AC despite his CHADVASC score. We would like to check the burden of Afib with 30-day event monitor. Patient is leaving the [facility] in 2 weeks, The patient was instructed to follow up with Dr. Reeves when he comes back to the [facility]   Attending Attestation:  Patient was seen & examined independently. At least 10 systems were reviewed in ROS. All systems reviewed  are within normal limits. Latest vital signs and labs were reviewed today. Case was discussed with house staff in morning rounds for assessment and plan.  Patient is medically stable for discharge . About 34 mins spent on discharge disposition.

## 2020-10-12 NOTE — PROGRESS NOTE ADULT - SUBJECTIVE AND OBJECTIVE BOX
24H events:    Patient is a 70y old Male who presents with a chief complaint of Sore throat and cough X 3 days (11 Oct 2020 00:47)    Primary diagnosis of Atrial fibrillation       Today is hospital day 2d. This morning patient was seen and examined at bedside, resting comfortably in bed.    Patient had an episode of afib with RVR resolved with metoprolol  Otherwise no complaints patient has improved    PAST MEDICAL & SURGICAL HISTORY  Lower extremity edema    Atrial fibrillation      SOCIAL HISTORY:  Social History:      ALLERGIES:  No Known Allergies    MEDICATIONS:  STANDING MEDICATIONS  amoxicillin  875 milliGRAM(s)/clavulanate 1 Tablet(s) Oral two times a day  aspirin  chewable 81 milliGRAM(s) Oral daily  ciprofloxacin/dexamethasone Otic Suspension - Peds 1 Drop(s) Right Ear Once  digoxin     Tablet 0.25 milliGRAM(s) Oral daily  enoxaparin Injectable 40 milliGRAM(s) SubCutaneous daily  furosemide    Tablet 20 milliGRAM(s) Oral daily  hydrochlorothiazide 25 milliGRAM(s) Oral daily  pantoprazole    Tablet 40 milliGRAM(s) Oral before breakfast    PRN MEDICATIONS  simethicone 80 milliGRAM(s) Chew four times a day PRN    VITALS:   T(F): 98.8  HR: 70  BP: 122/66  RR: 18  SpO2: 95%    PHYSICAL EXAM:  GENERAL: NAD,   NERVOUS SYSTEM:  Alert & Oriented X3, non focal   CHEST/LUNG: Clear to auscultation bilaterally;   HEART: irregular rate and rhythm;  ABDOMEN: Soft, Nontender, Nondistended; Bowel sounds present  EXTREMITIES: No clubbing, cyanosis, or edema, stasis dermatitis  LYMPH: No lymphadenopathy noted    LABS:                        15.2   6.28  )-----------( 158      ( 12 Oct 2020 06:38 )             47.6     10-12    139  |  102  |  14  ----------------------------<  133<H>  4.3   |  26  |  0.8    Ca    9.2      12 Oct 2020 06:38  Phos  3.3     10-11  Mg     2.2     10-11    TPro  6.9  /  Alb  4.1  /  TBili  0.6  /  DBili  x   /  AST  40  /  ALT  29  /  AlkPhos  50  10-12          Troponin T, Serum: <0.01 ng/mL (10-11-20 @ 16:51)      CARDIAC MARKERS ( 11 Oct 2020 16:51 )  x     / <0.01 ng/mL / x     / x     / x      CARDIAC MARKERS ( 11 Oct 2020 05:52 )  x     / <0.01 ng/mL / x     / x     / x      CARDIAC MARKERS ( 10 Oct 2020 18:29 )  x     / <0.01 ng/mL / x     / x     / x          RADIOLOGY:  < from: TTE Echo Complete w/o Contrast w/ Doppler (10.11.20 @ 16:11) >   1. Normal global left ventricular systolic function.   2. LV Ejection Fraction by Mejia's Method with a biplane EF of 69 %.   3. Mildly increased left ventricular internal cavity size.   4. The mean global longitudinal peak strain by speckle tracking is -11.5% which is reduced.   5. Normal left atrial size.   6. Normal right atrial size.   7. Mild to moderate mitral valve regurgitation.   8. Mild tricuspid regurgitation.   9. Mild aortic regurgitation.  10. Moderate aortic valve stenosis.  11. Mild pulmonic valve regurgitation.  12. Estimated pulmonary artery systolic pressure is 40.4 mmHg assuming a right atrial pressure of 15 mmHg, which is consistent with mild pulmonary hypertension.  13. Pulmonary hypertension is present.  14. Cannot exclude a possible PFO on color doppler study.  15. LA volume Index is 22.0 ml/m² ml/m2.  16. Peak transaortic gradient equals 53.8 mmHg, mean transaortic gradient equals 26.4 mmHg, the calculated aortic valve area equals 1.03 cm² by the continuity equation consistent with moderate aortic stenosis.  17. The aortic valve mean gradient is 26.4 mmHg consistent with moderate aortic stenosis.

## 2020-10-12 NOTE — DISCHARGE NOTE PROVIDER - NSDCCPCAREPLAN_GEN_ALL_CORE_FT
PRINCIPAL DISCHARGE DIAGNOSIS  Diagnosis: Atrial fibrillation  Assessment and Plan of Treatment: What is atrial fibrillation?  Atrial fibrillation is the most common heart rhythm problem (figure 1). The condition puts you at risk of stroke and other problems as well as death. Another term for atrial fibrillation is "A-fib."  In people with A-fib, the electrical signals that control the heartbeat are abnormal. The top 2 chambers (there are 4 chambers) of the heart stop pumping blood as strongly as normal. When this happens, the blood can start to form clots. These clots can travel to the brain through the blood vessels and cause strokes.  In some people, A-fib never goes away. In others, A-fib can come and go, even with treatment. If you had A-fib in the past, but have a normal heart rhythm now, ask your doctor what you can do to keep A-fib from coming back. Some people can reduce their chances of having A-fib again by:  ?Controlling their blood pressure  ?Avoiding or limiting alcohol  ?Cutting down on caffeine  ?Getting treatment for an overactive thyroid gland  ?Getting regular exercise  ?Losing weight (if they are overweight)  ?Reducing stress  What are the symptoms of atrial fibrillation?  Some people with A-fib have no symptoms. When symptoms do occur, they can include:  ?Feeling as though your heart is racing, skipping beats, or beating out of sync  ?Mild chest "tightness" or pain  ?Feeling lightheaded, dizzy, or like you might pass out  ?Having trouble breathing, especially with exercise      SECONDARY DISCHARGE DIAGNOSES  Diagnosis: Other congestive heart failure  Assessment and Plan of Treatment:     Diagnosis: Pharyngitis  Assessment and Plan of Treatment:      PRINCIPAL DISCHARGE DIAGNOSIS  Diagnosis: Atrial fibrillation  Assessment and Plan of Treatment: What is atrial fibrillation?  Atrial fibrillation is the most common heart rhythm problem (figure 1). The condition puts you at risk of stroke and other problems as well as death. Another term for atrial fibrillation is "A-fib."  In people with A-fib, the electrical signals that control the heartbeat are abnormal. The top 2 chambers (there are 4 chambers) of the heart stop pumping blood as strongly as normal. When this happens, the blood can start to form clots. These clots can travel to the brain through the blood vessels and cause strokes.  In some people, A-fib never goes away. In others, A-fib can come and go, even with treatment. If you had A-fib in the past, but have a normal heart rhythm now, ask your doctor what you can do to keep A-fib from coming back. Some people can reduce their chances of having A-fib again by:  ?Controlling their blood pressure  ?Avoiding or limiting alcohol  ?Cutting down on caffeine  ?Getting treatment for an overactive thyroid gland  ?Getting regular exercise  ?Losing weight (if they are overweight)  ?Reducing stress  What are the symptoms of atrial fibrillation?  Some people with A-fib have no symptoms. When symptoms do occur, they can include:  ?Feeling as though your heart is racing, skipping beats, or beating out of sync  ?Mild chest "tightness" or pain  ?Feeling lightheaded, dizzy, or like you might pass out  ?Having trouble breathing, especially with exercise

## 2020-10-12 NOTE — DISCHARGE NOTE PROVIDER - NSDCMRMEDTOKEN_GEN_ALL_CORE_FT
aspirin 81 mg oral tablet: 81 milligram(s) orally once a day  digoxin 250 mcg (0.25 mg) oral tablet: 1 tab(s) orally once a day  hydroCHLOROthiazide 25 mg oral tablet: 1 tab(s) orally once a day   aspirin 81 mg oral tablet: 81 milligram(s) orally once a day  digoxin 250 mcg (0.25 mg) oral tablet: 1 tab(s) orally once a day  hydroCHLOROthiazide 25 mg oral tablet: 1 tab(s) orally once a day  metoprolol tartrate 50 mg oral tablet: 1 tab(s) orally every 12 hours

## 2020-10-12 NOTE — DISCHARGE NOTE PROVIDER - NSDCFUADDAPPT_GEN_ALL_CORE_FT
-Patient is currently on Metoprolol Tartrate 25 mg PO BID. He is not rate controlled, Increase the dose to 50 BID   -Continue with HCTZ 25 mg PO daily   -There is no indication for an Echo with bubble study   -Patient would benefit from AC despite his CHADVASC score. We would like to check the burden of Afib with 30-day event monitor. Patient is leaving the [facility] in 2 weeks, The patient was instructed to follow up with Dr. Reeves when he comes back to the [facility]

## 2020-10-12 NOTE — CONSULT NOTE ADULT - SUBJECTIVE AND OBJECTIVE BOX
HPI:  71 YO M with PMHx of A.Fib (not on AC, only ASA) and chronic LE edema who presented to the hospital with a c/c of sore throat with cough and right ear fullness for the past x 2-3 days.   Patient denied any fevers/chills, Chest pain, shortness of breath, N/V/D/C.   In the ED, pt found to have pharyngitis and was given Amoxicillin. Atrial fibrillation was noted on EKG and the patient was given therapeutic Lovenox in the ED.  (11 Oct 2020 00:47)      PAST MEDICAL & SURGICAL HISTORY  Lower extremity edema    Atrial fibrillation        FAMILY HISTORY:  FAMILY HISTORY:      SOCIAL HISTORY:  never smoker  no Alcohol  no Drug    ALLERGIES:  No Known Allergies      MEDICATIONS:  MEDICATIONS  (STANDING):  aspirin  chewable 81 milliGRAM(s) Oral daily  ciprofloxacin  0.3% Otic Solution 1 Drop(s) Right Ear daily  digoxin     Tablet 0.25 milliGRAM(s) Oral daily  enoxaparin Injectable 40 milliGRAM(s) SubCutaneous daily  hydrochlorothiazide 25 milliGRAM(s) Oral daily  metoprolol tartrate 25 milliGRAM(s) Oral two times a day  pantoprazole    Tablet 40 milliGRAM(s) Oral before breakfast    MEDICATIONS  (PRN):  simethicone 80 milliGRAM(s) Chew four times a day PRN Gas      HOME MEDICATIONS:  Home Medications:  aspirin 81 mg oral tablet: 81 milligram(s) orally once a day (11 Oct 2020 01:04)  digoxin 250 mcg (0.25 mg) oral tablet: 1 tab(s) orally once a day (11 Oct 2020 01:04)  hydroCHLOROthiazide 25 mg oral tablet: 1 tab(s) orally once a day (11 Oct 2020 01:04)      VITALS:   T(F): 98.8 (10-12 @ 06:00), Max: 99.4 (10-12 @ 00:27)  HR: 104 (10-12 @ 11:13) (70 - 128)  BP: 119/64 (10-12 @ 11:13) (100/63 - 137/88)  BP(mean): --  RR: 18 (10-12 @ 06:00) (18 - 18)  SpO2: 95% (10-11 @ 20:55) (95% - 99%)    I&O's Summary    11 Oct 2020 07:01  -  12 Oct 2020 07:00  --------------------------------------------------------  IN: 0 mL / OUT: 200 mL / NET: -200 mL        REVIEW OF SYSTEMS:  CONSTITUTIONAL: No weakness, fevers or chills  EYES: No visual changes  ENT: No vertigo or throat pain   NECK: No pain or stiffness  RESPIRATORY: No cough, wheezing, hemoptysis; No shortness of breath  CARDIOVASCULAR: palpitations, No chest pain or Dyspnea  GASTROINTESTINAL: No abdominal or epigastric pain. No nausea, vomiting, or hematemesis; No diarrhea or constipation. No melena or hematochezia.  GENITOURINARY: No dysuria, frequency or hematuria  NEUROLOGICAL: No numbness or weakness  SKIN: No itching, no rashes  MSK: LE swelling bilaterally ( Dependant )    PHYSICAL EXAM:  NEURO: patient is awake , alert and oriented  GEN: Not in acute distress  NECK: no thyroid enlargement, no JVD  LUNGS: Clear to auscultation bilaterally   CARDIOVASCULAR: S1/S2 present, Irregular, no murmur heard   ABD: Soft, non-tender, non-distended, +BS  EXT: Trace NATALIIA bilaterally, Chronic venous changes   SKIN: Intact    LABS:                        15.2   6.28  )-----------( 158      ( 12 Oct 2020 06:38 )             47.6     10-12    139  |  102  |  14  ----------------------------<  133<H>  4.3   |  26  |  0.8    Ca    9.2      12 Oct 2020 06:38  Phos  3.3     10-11  Mg     2.2     10-11    TPro  6.9  /  Alb  4.1  /  TBili  0.6  /  DBili  x   /  AST  40  /  ALT  29  /  AlkPhos  50  10-12      Troponin T, Serum: <0.01 ng/mL (10-11-20 @ 16:51)    CARDIAC MARKERS ( 11 Oct 2020 16:51 )  x     / <0.01 ng/mL / x     / x     / x      CARDIAC MARKERS ( 11 Oct 2020 05:52 )  x     / <0.01 ng/mL / x     / x     / x      CARDIAC MARKERS ( 10 Oct 2020 18:29 )  x     / <0.01 ng/mL / x     / x     / x            Troponin trend:    Serum Pro-Brain Natriuretic Peptide: 1038 pg/mL (10-10-20 @ 18:29)          RADIOLOGY:  -CXR: No acute cardiopulmonary events  -TTE: Normal EF 69%, Mild dilation of LV cavity, moderate aortic stenosis   -CCTA:  -STRESS TEST:  -CATHETERIZATION:    ECG: Afib with RVR, RBBB, left anterior fascicular block     TELEMETRY EVENTS:    HPI:  69 YO M with PMHx of A.Fib (not on AC, only ASA) and chronic LE edema who presented to the hospital with a c/c of sore throat with cough and right ear fullness for the past x 2-3 days.   Patient denied any fevers/chills, Chest pain, shortness of breath, N/V/D/C.   In the ED, pt found to have pharyngitis and was given Amoxicillin. Atrial fibrillation was noted on EKG and the patient was given therapeutic Lovenox in the ED.  (11 Oct 2020 00:47)      PAST MEDICAL & SURGICAL HISTORY  Lower extremity edema  Atrial fibrillation    No pertinent family history of premature CAD in first degree relatives      SOCIAL HISTORY:  never smoker  no Alcohol  no Drug    ALLERGIES:  No Known Allergies      MEDICATIONS:  MEDICATIONS  (STANDING):  aspirin  chewable 81 milliGRAM(s) Oral daily  ciprofloxacin  0.3% Otic Solution 1 Drop(s) Right Ear daily  digoxin     Tablet 0.25 milliGRAM(s) Oral daily  enoxaparin Injectable 40 milliGRAM(s) SubCutaneous daily  hydrochlorothiazide 25 milliGRAM(s) Oral daily  metoprolol tartrate 25 milliGRAM(s) Oral two times a day  pantoprazole    Tablet 40 milliGRAM(s) Oral before breakfast    MEDICATIONS  (PRN):  simethicone 80 milliGRAM(s) Chew four times a day PRN Gas      HOME MEDICATIONS:  Home Medications:  aspirin 81 mg oral tablet: 81 milligram(s) orally once a day (11 Oct 2020 01:04)  digoxin 250 mcg (0.25 mg) oral tablet: 1 tab(s) orally once a day (11 Oct 2020 01:04)  hydroCHLOROthiazide 25 mg oral tablet: 1 tab(s) orally once a day (11 Oct 2020 01:04)      VITALS:   T(F): 98.8 (10-12 @ 06:00), Max: 99.4 (10-12 @ 00:27)  HR: 104 (10-12 @ 11:13) (70 - 128)  BP: 119/64 (10-12 @ 11:13) (100/63 - 137/88)  BP(mean): --  RR: 18 (10-12 @ 06:00) (18 - 18)  SpO2: 95% (10-11 @ 20:55) (95% - 99%)    I&O's Summary    11 Oct 2020 07:01  -  12 Oct 2020 07:00  --------------------------------------------------------  IN: 0 mL / OUT: 200 mL / NET: -200 mL        REVIEW OF SYSTEMS:  CONSTITUTIONAL: No weakness, fevers or chills  EYES: No visual changes  ENT: No vertigo or throat pain   NECK: No pain or stiffness  RESPIRATORY: No cough, wheezing, hemoptysis; No shortness of breath  CARDIOVASCULAR: palpitations, No chest pain or Dyspnea  GASTROINTESTINAL: No abdominal or epigastric pain. No nausea, vomiting, or hematemesis; No diarrhea or constipation. No melena or hematochezia.  GENITOURINARY: No dysuria, frequency or hematuria  NEUROLOGICAL: No numbness or weakness  SKIN: No itching, no rashes  MSK: LE swelling bilaterally ( Dependant )    PHYSICAL EXAM:  NEURO: patient is awake , alert and oriented  GEN: Not in acute distress  NECK: no thyroid enlargement, no JVD  LUNGS: Clear to auscultation bilaterally   CARDIOVASCULAR: S1/S2 present, Irregular, no murmur heard   ABD: Soft, non-tender, non-distended, +BS  EXT: Trace NATALIIA bilaterally, Chronic venous changes   SKIN: Intact      LABS:                        15.2   6.28  )-----------( 158      ( 12 Oct 2020 06:38 )             47.6     10-12    139  |  102  |  14  ----------------------------<  133<H>  4.3   |  26  |  0.8    Ca    9.2      12 Oct 2020 06:38  Phos  3.3     10-11  Mg     2.2     10-11    TPro  6.9  /  Alb  4.1  /  TBili  0.6  /  DBili  x   /  AST  40  /  ALT  29  /  AlkPhos  50  10-12        CARDIAC MARKERS ( 11 Oct 2020 16:51 )  x     / <0.01 ng/mL / x     / x     / x      CARDIAC MARKERS ( 11 Oct 2020 05:52 )  x     / <0.01 ng/mL / x     / x     / x      CARDIAC MARKERS ( 10 Oct 2020 18:29 )  x     / <0.01 ng/mL / x     / x     / x          Serum Pro-Brain Natriuretic Peptide: 1038 pg/mL (10-10-20 @ 18:29)        RADIOLOGY:  -CXR: No acute cardiopulmonary events  -TTE: Normal EF 69%, Mild dilation of LV cavity, moderate aortic stenosis   -CCTA:  -STRESS TEST:  -CATHETERIZATION:    ECG: Afib with RVR, RBBB, left anterior fascicular block

## 2020-10-12 NOTE — DISCHARGE NOTE PROVIDER - CARE PROVIDER_API CALL
Lulu Reeves)  Cardiovascular Disease; Internal Medicine  [address] [place] [address]  Phone: [phone]  Fax: [phone]  Follow Up Time: 1 month

## 2020-10-12 NOTE — PROGRESS NOTE ADULT - ATTENDING COMMENTS
70 year old man  with PMHx of A.Fib (not on AC, only ASA) and chronic LE edema who presented to the hospital with a c/o of sore throat with cough and right ear fullness for the past x 2-3 days.       ASSESSMENT & PLAN:   chronic Atrial fibrillation  Telemetry Monitoring    Started on Metoprolol. Digoxin level 0.5. If continues to have RVR , give a loading dose  Get EP/cardio consult. Does not follow any cardiology prior to this admission  Suspected otitis externa- cipro drops  keep off antibiotics for sore throat  Electrolyte supplementation and follow up with BMP. Keep K+>4, Mg>2   TTE 09/11- Ef 69%. P Hypertension . PFO not excluded  Hypertension - controlled - continue with HCTZ. D/c Lasix  Discharge Disposition: Discharge anticipated for tomorrow

## 2020-10-13 VITALS
TEMPERATURE: 97 F | HEART RATE: 88 BPM | SYSTOLIC BLOOD PRESSURE: 128 MMHG | DIASTOLIC BLOOD PRESSURE: 55 MMHG | RESPIRATION RATE: 18 BRPM

## 2020-10-13 LAB
ALBUMIN SERPL ELPH-MCNC: 4.3 G/DL — SIGNIFICANT CHANGE UP (ref 3.5–5.2)
ALP SERPL-CCNC: 54 U/L — SIGNIFICANT CHANGE UP (ref 30–115)
ALT FLD-CCNC: 33 U/L — SIGNIFICANT CHANGE UP (ref 0–41)
ANION GAP SERPL CALC-SCNC: 13 MMOL/L — SIGNIFICANT CHANGE UP (ref 7–14)
AST SERPL-CCNC: 40 U/L — SIGNIFICANT CHANGE UP (ref 0–41)
BASOPHILS # BLD AUTO: 0.04 K/UL — SIGNIFICANT CHANGE UP (ref 0–0.2)
BASOPHILS NFR BLD AUTO: 0.5 % — SIGNIFICANT CHANGE UP (ref 0–1)
BILIRUB SERPL-MCNC: 0.7 MG/DL — SIGNIFICANT CHANGE UP (ref 0.2–1.2)
BUN SERPL-MCNC: 21 MG/DL — HIGH (ref 10–20)
CALCIUM SERPL-MCNC: 9.5 MG/DL — SIGNIFICANT CHANGE UP (ref 8.5–10.1)
CHLORIDE SERPL-SCNC: 100 MMOL/L — SIGNIFICANT CHANGE UP (ref 98–110)
CO2 SERPL-SCNC: 26 MMOL/L — SIGNIFICANT CHANGE UP (ref 17–32)
CREAT SERPL-MCNC: 0.9 MG/DL — SIGNIFICANT CHANGE UP (ref 0.7–1.5)
EOSINOPHIL # BLD AUTO: 0.16 K/UL — SIGNIFICANT CHANGE UP (ref 0–0.7)
EOSINOPHIL NFR BLD AUTO: 2.1 % — SIGNIFICANT CHANGE UP (ref 0–8)
GLUCOSE SERPL-MCNC: 153 MG/DL — HIGH (ref 70–99)
HCT VFR BLD CALC: 48.3 % — SIGNIFICANT CHANGE UP (ref 42–52)
HGB BLD-MCNC: 15.8 G/DL — SIGNIFICANT CHANGE UP (ref 14–18)
IMM GRANULOCYTES NFR BLD AUTO: 0.3 % — SIGNIFICANT CHANGE UP (ref 0.1–0.3)
LYMPHOCYTES # BLD AUTO: 1.57 K/UL — SIGNIFICANT CHANGE UP (ref 1.2–3.4)
LYMPHOCYTES # BLD AUTO: 20.5 % — SIGNIFICANT CHANGE UP (ref 20.5–51.1)
MAGNESIUM SERPL-MCNC: 2 MG/DL — SIGNIFICANT CHANGE UP (ref 1.8–2.4)
MCHC RBC-ENTMCNC: 28.8 PG — SIGNIFICANT CHANGE UP (ref 27–31)
MCHC RBC-ENTMCNC: 32.7 G/DL — SIGNIFICANT CHANGE UP (ref 32–37)
MCV RBC AUTO: 88 FL — SIGNIFICANT CHANGE UP (ref 80–94)
MONOCYTES # BLD AUTO: 0.6 K/UL — SIGNIFICANT CHANGE UP (ref 0.1–0.6)
MONOCYTES NFR BLD AUTO: 7.8 % — SIGNIFICANT CHANGE UP (ref 1.7–9.3)
NEUTROPHILS # BLD AUTO: 5.26 K/UL — SIGNIFICANT CHANGE UP (ref 1.4–6.5)
NEUTROPHILS NFR BLD AUTO: 68.8 % — SIGNIFICANT CHANGE UP (ref 42.2–75.2)
NRBC # BLD: 0 /100 WBCS — SIGNIFICANT CHANGE UP (ref 0–0)
PLATELET # BLD AUTO: 150 K/UL — SIGNIFICANT CHANGE UP (ref 130–400)
POTASSIUM SERPL-MCNC: 4.1 MMOL/L — SIGNIFICANT CHANGE UP (ref 3.5–5)
POTASSIUM SERPL-SCNC: 4.1 MMOL/L — SIGNIFICANT CHANGE UP (ref 3.5–5)
PROT SERPL-MCNC: 7.4 G/DL — SIGNIFICANT CHANGE UP (ref 6–8)
RBC # BLD: 5.49 M/UL — SIGNIFICANT CHANGE UP (ref 4.7–6.1)
RBC # FLD: 12.9 % — SIGNIFICANT CHANGE UP (ref 11.5–14.5)
SODIUM SERPL-SCNC: 139 MMOL/L — SIGNIFICANT CHANGE UP (ref 135–146)
WBC # BLD: 7.65 K/UL — SIGNIFICANT CHANGE UP (ref 4.8–10.8)
WBC # FLD AUTO: 7.65 K/UL — SIGNIFICANT CHANGE UP (ref 4.8–10.8)

## 2020-10-13 PROCEDURE — 99239 HOSP IP/OBS DSCHRG MGMT >30: CPT

## 2020-10-13 RX ORDER — METOPROLOL TARTRATE 50 MG
1 TABLET ORAL
Qty: 60 | Refills: 0
Start: 2020-10-13 | End: 2020-11-11

## 2020-10-13 RX ADMIN — PANTOPRAZOLE SODIUM 40 MILLIGRAM(S): 20 TABLET, DELAYED RELEASE ORAL at 05:29

## 2020-10-13 RX ADMIN — Medication 25 MILLIGRAM(S): at 05:29

## 2020-10-13 RX ADMIN — Medication 0.25 MILLIGRAM(S): at 05:29

## 2020-10-13 RX ADMIN — Medication 81 MILLIGRAM(S): at 11:27

## 2020-10-13 RX ADMIN — Medication 1 DROP(S): at 11:31

## 2020-10-13 RX ADMIN — ENOXAPARIN SODIUM 40 MILLIGRAM(S): 100 INJECTION SUBCUTANEOUS at 11:28

## 2020-10-13 RX ADMIN — Medication 50 MILLIGRAM(S): at 05:29

## 2020-10-13 NOTE — DISCHARGE NOTE NURSING/CASE MANAGEMENT/SOCIAL WORK - NSDCFUADDAPPT_GEN_ALL_CORE_FT
-Patient is currently on Metoprolol Tartrate 25 mg PO BID. He is not rate controlled, Increase the dose to 50 BID   -Continue with HCTZ 25 mg PO daily   -There is no indication for an Echo with bubble study   -Patient would benefit from AC despite his CHADVASC score. We would like to check the burden of Afib with 30-day event monitor. Patient is leaving the [facility] in 2 weeks, The patient was instructed to follow up with Dr. Reeves when he comes back to the [facility]

## 2020-10-13 NOTE — PROGRESS NOTE ADULT - ASSESSMENT
69 YO M with PMHx of A.Fib (not on AC, only ASA) and chronic LE edema who presented to the hospital with a c/c of sore throat with cough and right ear fullness for the past x 2-3 days.     #Viral v/s bacterial pharyngitis; more likely viral  - Sore throat, cough and ear fullness for the last 3 days; rotatory vertigo  - Low Centor score. no AB  - Anti-tussives and lozenges PRN  - amoxicillin stopped    #Atrial fibrillation; RVR  - CHADSVASc 1. Only on aspirin. Will continue  - 2D echo EF 69%  - Continue Digoxin and HCTZ   - Metoprolol succinate 50 q 12   - No bubble study needed by cardiology  - Will need monitor to decide AC necessity    #Chronic LE swelling  - As per patient and family this has been ongoing for several years  - Taking Lasix intermittently as outpatient.   - c/wHydrochlorothiazide      - DVT Prophylaxis: Lovenox  - Diet: DASH/TLC  - GI Prophylaxis: Pantoprazole   - Activity: AAT  - Code Status: Full Code

## 2020-10-13 NOTE — DISCHARGE NOTE NURSING/CASE MANAGEMENT/SOCIAL WORK - PATIENT PORTAL LINK FT
You can access the FollowMyHealth Patient Portal offered by [facility] by registering at the following website: [email or web address]. By joining [employer]’s FollowMyHealth portal, you will also be able to view your health information using other applications (apps) compatible with our system.

## 2020-10-13 NOTE — PROGRESS NOTE ADULT - SUBJECTIVE AND OBJECTIVE BOX
24H events:    Patient is a 70y old Male who presents with a chief complaint of Sore throat and cough X 3 days (12 Oct 2020 13:12)    Primary diagnosis of Atrial fibrillation       Today is hospital day 3d. This morning patient was seen and examined at bedside, resting comfortably in bed.    Patient is feeling well  Has no complaints    PAST MEDICAL & SURGICAL HISTORY  Lower extremity edema    Atrial fibrillation      SOCIAL HISTORY:  Social History:      ALLERGIES:  No Known Allergies    MEDICATIONS:  STANDING MEDICATIONS  aspirin  chewable 81 milliGRAM(s) Oral daily  ciprofloxacin  0.3% Otic Solution 1 Drop(s) Right Ear daily  digoxin     Tablet 0.25 milliGRAM(s) Oral daily  enoxaparin Injectable 40 milliGRAM(s) SubCutaneous daily  hydrochlorothiazide 25 milliGRAM(s) Oral daily  metoprolol tartrate 50 milliGRAM(s) Oral every 12 hours  pantoprazole    Tablet 40 milliGRAM(s) Oral before breakfast    PRN MEDICATIONS  simethicone 80 milliGRAM(s) Chew four times a day PRN    VITALS:   T(F): 96.5  HR: 91  BP: 120/68  RR: 18  SpO2: 98%    PHYSICAL EXAM:  GENERAL: NAD  NERVOUS SYSTEM:  Alert & Oriented X3, non focal   CHEST/LUNG: Clear to auscultation bilaterally; No rales, rhonchi, wheezing, or rubs  HEART: irregular rate and rhythm; No murmurs, rubs, or gallops  ABDOMEN: Soft, Nontender, Nondistended; Bowel sounds present  EXTREMITIES No clubbing, cyanosis, or edema    LABS:                        15.8   7.65  )-----------( 150      ( 13 Oct 2020 04:30 )             48.3     10-13    139  |  100  |  21<H>  ----------------------------<  153<H>  4.1   |  26  |  0.9    Ca    9.5      13 Oct 2020 04:30  Mg     2.0     10-13    TPro  7.4  /  Alb  4.3  /  TBili  0.7  /  DBili  x   /  AST  40  /  ALT  33  /  AlkPhos  54  10-13              CARDIAC MARKERS ( 11 Oct 2020 16:51 )  x     / <0.01 ng/mL / x     / x     / x          RADIOLOGY:

## 2020-10-13 NOTE — PROGRESS NOTE ADULT - ATTENDING COMMENTS
70 year old man  with PMHx of A.Fib (not on AC, only ASA) and chronic LE edema who presented to the hospital with a c/o of sore throat with cough and right ear fullness for the past x 2-3 days.       ASSESSMENT & PLAN:   chronic Atrial fibrillation  Telemetry Monitoring    Started on Metoprolol. Digoxin level 0.5. Discharge on 50 milligrams twice daily along with current dose of digoxin  Cardio consult. Loop monitor as outpatient , cardiology to assess the need for anti-coagulation   Suspected otitis externa- cipro drops  keep off antibiotics for sore throat  Electrolyte supplementation and follow up with BMP. Keep K+>4, Mg>2   TTE 09/11- Ef 69%. P Hypertension . PFO not excluded  Hypertension - controlled - continue with HCTZ. D/c Lasix  Discharge Disposition: Discharge to home today

## 2020-10-15 DIAGNOSIS — H60.91 UNSPECIFIED OTITIS EXTERNA, RIGHT EAR: ICD-10-CM

## 2020-10-15 DIAGNOSIS — I44.4 LEFT ANTERIOR FASCICULAR BLOCK: ICD-10-CM

## 2020-10-15 DIAGNOSIS — Z79.82 LONG TERM (CURRENT) USE OF ASPIRIN: ICD-10-CM

## 2020-10-15 DIAGNOSIS — I10 ESSENTIAL (PRIMARY) HYPERTENSION: ICD-10-CM

## 2020-10-15 DIAGNOSIS — I48.91 UNSPECIFIED ATRIAL FIBRILLATION: ICD-10-CM

## 2020-10-15 DIAGNOSIS — J02.8 ACUTE PHARYNGITIS DUE TO OTHER SPECIFIED ORGANISMS: ICD-10-CM

## 2020-10-15 DIAGNOSIS — I48.20 CHRONIC ATRIAL FIBRILLATION, UNSPECIFIED: ICD-10-CM

## 2020-10-29 ENCOUNTER — EMERGENCY (EMERGENCY)
Facility: HOSPITAL | Age: 70
LOS: 0 days | Discharge: HOME | End: 2020-10-29
Attending: EMERGENCY MEDICINE | Admitting: EMERGENCY MEDICINE
Payer: MEDICAID

## 2020-10-29 VITALS
SYSTOLIC BLOOD PRESSURE: 116 MMHG | OXYGEN SATURATION: 98 % | HEIGHT: 72 IN | HEART RATE: 86 BPM | TEMPERATURE: 98 F | RESPIRATION RATE: 18 BRPM | DIASTOLIC BLOOD PRESSURE: 85 MMHG

## 2020-10-29 DIAGNOSIS — S63.502A UNSPECIFIED SPRAIN OF LEFT WRIST, INITIAL ENCOUNTER: ICD-10-CM

## 2020-10-29 DIAGNOSIS — M79.646 PAIN IN UNSPECIFIED FINGER(S): ICD-10-CM
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
## 2020-10-29 DIAGNOSIS — M79.645 PAIN IN LEFT FINGER(S): ICD-10-CM
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
## 2020-10-29 PROBLEM — R60.0 LOCALIZED EDEMA: Chronic | Status: ACTIVE | Noted: 2020-10-11

## 2020-10-29 PROBLEM — I48.91 UNSPECIFIED ATRIAL FIBRILLATION: Chronic | Status: ACTIVE | Noted: 2020-10-11

## 2020-10-29 PROCEDURE — 73130 X-RAY EXAM OF HAND: CPT | Mod: 26,LT

## 2020-10-29 PROCEDURE — 29125 APPL SHORT ARM SPLINT STATIC: CPT

## 2020-10-29 PROCEDURE — 99283 EMERGENCY DEPT VISIT LOW MDM: CPT | Mod: 25

## 2020-10-29 PROCEDURE — 73110 X-RAY EXAM OF WRIST: CPT | Mod: 26,LT

## 2020-10-29 RX ORDER — ACETAMINOPHEN 500 MG
975 TABLET ORAL ONCE
Refills: 0 | Status: COMPLETED | OUTPATIENT
Start: 2020-10-29 | End: 2020-10-29

## 2020-10-29 RX ADMIN — Medication 975 MILLIGRAM(S): at 13:29

## 2020-10-29 NOTE — ED PROVIDER NOTE - OBJECTIVE STATEMENT
70 y.o. male with a PMH of heart disease presented to the ER c/o trip and fall this AM.  Pt fell directly onto Left wrist.  Denies any head trauma, LOC, dizziness, chest pain, SOB, abdominal pain, extremity weakness/paresthesias, neck/back pain.  No other injuries or complaints.

## 2020-10-29 NOTE — ED PROVIDER NOTE - NSFOLLOWUPCLINICS_GEN_ALL_ED_FT
[facility]  Orthpedic  [address], [place]   Phone: [phone]  Fax:   Follow Up Time: 1-3 Days

## 2020-10-29 NOTE — ED PROVIDER NOTE - NSFOLLOWUPINSTRUCTIONS_ED_ALL_ED_FT
Wrist Sprain, Adult  A wrist sprain is a stretch or tear in the strong, fibrous tissues (ligaments) that connect your wrist bones. There are three types of wrist sprains:  Grade 1. In this type of sprain, the ligament is stretched more than normal.Grade 2. In this type of sprain, the ligament is partially torn. You may be able to move your wrist, but not very much.Grade 3. In this type of sprain, the ligament or muscle is completely torn. You may find it difficult or extremely painful to move your wrist even a little.What are the causes?  A wrist sprain can be caused by using the wrist too much during sports, exercise, or at work. It can also happen with a fall or during an accident.  What increases the risk?  This condition is more likely to occur in people:  With a previous wrist or arm injury.With poor wrist strength and flexibility.Who play contact sports, such as football or soccer.Who play sports that may result in a fall, such as skateboarding, biking, skiing, or snowboarding.Who do not exercise regularly.Who use exercise equipment that does not fit well.What are the signs or symptoms?  Symptoms of this condition include:  Pain in the wrist, arm, or hand.Swelling or bruised skin near the wrist, hand, or arm. The skin may look yellow or kind of blue.Stiffness or trouble moving the hand.Hearing a pop or feeling a tear at the time of the injury.A warm feeling in the skin around the wrist.How is this diagnosed?  This condition is diagnosed with a physical exam. Sometimes an X-ray is taken to make sure a bone did not break. If your health care provider thinks that you tore a ligament, he or she may order an MRI of your wrist.  How is this treated?  This condition is treated by resting and applying ice to your wrist. Additional treatment may include:  Medicine for pain and inflammation.A splint to keep your wrist still (immobilized).Exercises to strengthen and stretch your wrist.Surgery. This may be done if the ligament is completely torn.Follow these instructions at home:  If you have a splint:        Do not put pressure on any part of the splint until it is fully hardened. This may take several hours.Wear the splint as told by your health care provider. Remove it only as told by your health care provider.Loosen the splint if your fingers tingle, become numb, or turn cold and blue.If your splint is not waterproof:  Do not let it get wet.Cover it with a watertight covering when you take a bath or a shower.Keep the splint clean.Managing pain, stiffness, and swelling        If directed, put ice on the injured area.  If you have a removable splint, remove it as told by your health care provider.Put ice in a plastic bag.Place a towel between your skin and the bag or between the splint and the bag.Leave the ice on for 20 minutes, 2–3 times per day.Move your fingers often to avoid stiffness and to lessen swelling.Raise (elevate) the injured area above the level of your heart while you are sitting or lying down.Activity     Rest your wrist. Do not do things that cause pain.Return to your normal activities as told by your health care provider. Ask your health care provider what activities are safe for you.Do exercises as told by your health care provider.General instructions     Take over-the-counter and prescription medicines only as told by your health care provider.Do not use any products that contain nicotine or tobacco, such as cigarettes and e-cigarettes. These can delay healing. If you need help quitting, ask your health care provider.Ask your health care provider when it is safe to drive if you have a splint.Keep all follow-up visits as told by your health care provider. This is important.Contact a health care provider if:  Your pain, bruising, or swelling gets worse.Your skin becomes red, gets a rash, or has open sores.Your pain does not get better or it gets worse.Get help right away if:  You have a new or sudden sharp pain in the hand, arm, or wrist.You have tingling or numbness in your hand.Your fingers turn white, very red, or cold and blue.You cannot move your fingers.This information is not intended to replace advice given to you by your health care provider. Make sure you discuss any questions you have with your health care provider.    Document Released: 08/21/2015 Document Revised: 07/15/2017 Document Reviewed: 07/06/2017  [employer] Patient Education © 2019 [employer]

## 2020-10-29 NOTE — ED PROVIDER NOTE - MUSCULOSKELETAL, MLM
Spine appears normal, range of motion is not limited, no midline or paraspinal .tenderness  Left hand/wrist:  limited ROM thumb due to pain, (+) tenderness base of thumb, no sensory deficit, radial pulse 2+, cap refill < 2sec

## 2020-10-29 NOTE — ED PROVIDER NOTE - ATTENDING CONTRIBUTION TO CARE
71 y/o male h/o afib (denies AC), c/o trip and fall from standing today, reports isolated left thumb / wrist pain, constant, denies radiation, worse with certain movement and position, better with rest, denies head trauma, LOC or other injuries at present.    Old chart reviewed.  I have reviewed and agree with the nursing note, except as documented in my note.    VSS, awake, alert, in NAD, no skin lacerations or abrasions. LUE; no shoulder, elbow, hand tenderness, wrist; ttp over prox thumb / wrist with mild swelling, no gross deformity, crepitus or snuffbox tenderness, ROM intact, sensory and motor function to radial, median and ulnar nerve of hand intact. AB/AD duction, flexion, extension of digits and opposition of thumb normal, NV intact, capillary refill <3 seconds, radial pulses 2+, pain not out of proportion to exam not appreciated.    Angulation of radius fracture < 15 degrees.

## 2020-10-29 NOTE — ED PROVIDER NOTE - CARE PLAN
Aime Genevieve  : 1978  Primary: [facility]  Secondary:  [facility] at [address] [place] [address], [address] [address] [facility]  [phone]   [phone]       Pt unable to attend scheduled appointment. Due to work.     Pau Traore, PT, DPT
Principal Discharge DX:	Sprain of left wrist, initial encounter

## 2024-02-28 NOTE — PATIENT PROFILE ADULT - NSPRESCRALCFREQ_GEN_A_NUR
2nd attempt: LEATHA at [phone] informing pt that we need to r/s her [facility] appt for another date, next available due to scheduling conflict   Never

## 2025-09-08 ENCOUNTER — RESULT REVIEW (OUTPATIENT)
Age: 75
End: 2025-09-08

## 2025-09-15 ENCOUNTER — RESULT REVIEW (OUTPATIENT)
Age: 75
End: 2025-09-15

## 2025-09-17 ENCOUNTER — TRANSCRIPTION ENCOUNTER (OUTPATIENT)
Age: 75
End: 2025-09-17

## 2025-09-18 ENCOUNTER — TRANSCRIPTION ENCOUNTER (OUTPATIENT)
Age: 75
End: 2025-09-18

## 2025-09-18 PROBLEM — Z00.00 ENCOUNTER FOR PREVENTIVE HEALTH EXAMINATION: Status: ACTIVE | Noted: 2025-09-18

## 2025-09-19 ENCOUNTER — NON-APPOINTMENT (OUTPATIENT)
Age: 75
End: 2025-09-19

## 2025-09-19 ENCOUNTER — APPOINTMENT (OUTPATIENT)
Dept: CARE COORDINATION | Facility: HOME HEALTH | Age: 75
End: 2025-09-19

## 2025-09-23 PROBLEM — I50.30 (HFPEF) HEART FAILURE WITH PRESERVED EJECTION FRACTION: Status: ACTIVE | Noted: 2025-09-23

## 2025-09-23 PROBLEM — E11.59 TYPE 2 DIABETES MELLITUS WITH OTHER CIRCULATORY COMPLICATION, WITHOUT LONG-TERM CURRENT USE OF INSULIN: Status: ACTIVE | Noted: 2025-09-23

## 2025-09-23 PROBLEM — Z87.438 HISTORY OF BPH: Status: RESOLVED | Noted: 2025-09-23 | Resolved: 2025-09-23

## 2025-09-23 PROBLEM — I87.2 VENOUS STASIS DERMATITIS: Status: ACTIVE | Noted: 2025-09-23

## 2025-09-23 PROBLEM — I48.91 AFIB: Status: ACTIVE | Noted: 2025-09-23

## 2025-09-23 PROBLEM — D50.9 IRON DEFICIENCY ANEMIA, UNSPECIFIED IRON DEFICIENCY ANEMIA TYPE: Status: ACTIVE | Noted: 2025-09-23

## 2025-09-23 PROBLEM — I35.0 AORTIC STENOSIS: Status: ACTIVE | Noted: 2025-09-23
